# Patient Record
Sex: FEMALE | URBAN - METROPOLITAN AREA
[De-identification: names, ages, dates, MRNs, and addresses within clinical notes are randomized per-mention and may not be internally consistent; named-entity substitution may affect disease eponyms.]

---

## 2019-05-30 PROBLEM — 238131007 OVERWEIGHT: Status: ACTIVE | Noted: 2019-05-30

## 2022-04-30 ENCOUNTER — TELEPHONE ENCOUNTER (OUTPATIENT)
Dept: URBAN - METROPOLITAN AREA CLINIC 121 | Facility: CLINIC | Age: 77
End: 2022-04-30

## 2022-04-30 RX ORDER — LEVOTHYROXINE SODIUM 25 UG/1
TABLET ORAL
OUTPATIENT
Start: 2008-03-25 | End: 2019-05-30

## 2022-04-30 RX ORDER — FLAXSEED OIL 1000 MG
CAPSULE ORAL
OUTPATIENT
Start: 2008-03-25 | End: 2019-05-30

## 2022-04-30 RX ORDER — FLAXSEED OIL 1000 MG
CAPSULE ORAL
OUTPATIENT
Start: 2008-03-25

## 2022-04-30 RX ORDER — FAMOTIDINE 10 MG
TABLET ORAL
OUTPATIENT
Start: 2008-03-25

## 2022-04-30 RX ORDER — BIOTIN 10 MG
TABLET ORAL
OUTPATIENT
Start: 2008-03-25

## 2022-04-30 RX ORDER — FAMOTIDINE 10 MG
TABLET ORAL
OUTPATIENT
Start: 2008-03-25 | End: 2019-05-30

## 2022-04-30 RX ORDER — BIOTIN 10 MG
TABLET ORAL
OUTPATIENT
Start: 2008-03-25 | End: 2019-05-30

## 2022-04-30 RX ORDER — LEVOTHYROXINE SODIUM 25 UG/1
TABLET ORAL
OUTPATIENT
Start: 2008-03-25

## 2022-05-01 ENCOUNTER — TELEPHONE ENCOUNTER (OUTPATIENT)
Dept: URBAN - METROPOLITAN AREA CLINIC 121 | Facility: CLINIC | Age: 77
End: 2022-05-01

## 2022-05-01 RX ORDER — LACTOBACILLUS RHAMNOSUS GG 10B CELL
CAPSULE ORAL
Status: ACTIVE | COMMUNITY
Start: 2019-05-30

## 2022-05-01 RX ORDER — LINACLOTIDE 290 UG/1
1 CAPSULE PO QAM 30 MINUTES BEFORE FIRST MEAL CAPSULE, GELATIN COATED ORAL
Status: ACTIVE | COMMUNITY
Start: 2021-06-04

## 2022-05-01 RX ORDER — POLYETHYLENE GLYCOL-3350 AND ELECTROLYTES WITH FLAVOR PACK 240; 5.84; 2.98; 6.72; 22.72 G/278.26G; G/278.26G; G/278.26G; G/278.26G; G/278.26G
MIX AND USE AS DIRECTED POWDER, FOR SOLUTION ORAL
Status: ACTIVE | COMMUNITY
Start: 2019-05-30

## 2022-05-01 RX ORDER — ATENOLOL 50 MG/1
TABLET ORAL
Status: ACTIVE | COMMUNITY
Start: 2008-03-25

## 2022-05-01 RX ORDER — FLUTICASONE PROPIONATE AND SALMETEROL 50; 100 UG/1; UG/1
POWDER RESPIRATORY (INHALATION)
Status: ACTIVE | COMMUNITY
Start: 2019-05-30

## 2022-05-01 RX ORDER — ASPIRIN 81 MG/1
TABLET, FILM COATED ORAL
Status: ACTIVE | COMMUNITY
Start: 2019-06-27

## 2022-05-01 RX ORDER — FEXOFENADINE HYDROCHLORIDE 180 MG/1
TABLET, FILM COATED ORAL
Status: ACTIVE | COMMUNITY
Start: 2019-05-30

## 2022-05-01 RX ORDER — CHOLECALCIFEROL (VITAMIN D3) 25 MCG
TABLET,CHEWABLE ORAL
Status: ACTIVE | COMMUNITY
Start: 2019-05-30

## 2024-06-04 ENCOUNTER — DASHBOARD ENCOUNTERS (OUTPATIENT)
Age: 79
End: 2024-06-04

## 2024-06-04 ENCOUNTER — TELEPHONE ENCOUNTER (OUTPATIENT)
Dept: URBAN - METROPOLITAN AREA CLINIC 27 | Facility: CLINIC | Age: 79
End: 2024-06-04

## 2024-06-04 ENCOUNTER — OFFICE VISIT (OUTPATIENT)
Dept: URBAN - METROPOLITAN AREA CLINIC 27 | Facility: CLINIC | Age: 79
End: 2024-06-04
Payer: COMMERCIAL

## 2024-06-04 VITALS
DIASTOLIC BLOOD PRESSURE: 100 MMHG | WEIGHT: 190 LBS | BODY MASS INDEX: 28.14 KG/M2 | HEART RATE: 84 BPM | HEIGHT: 69 IN | SYSTOLIC BLOOD PRESSURE: 160 MMHG

## 2024-06-04 DIAGNOSIS — Z09 ENCOUNTER FOR COLONOSCOPY FOLLOWING COLON POLYP REMOVAL: ICD-10-CM

## 2024-06-04 DIAGNOSIS — Z86.010 PERSONAL HISTORY OF COLONIC POLYPS: ICD-10-CM

## 2024-06-04 PROBLEM — 131531000119103: Status: ACTIVE | Noted: 2024-06-04

## 2024-06-04 PROCEDURE — 99204 OFFICE O/P NEW MOD 45 MIN: CPT | Performed by: PHYSICIAN ASSISTANT

## 2024-06-04 RX ORDER — POLYETHYLENE GLYCOL-3350 AND ELECTROLYTES WITH FLAVOR PACK 240; 5.84; 2.98; 6.72; 22.72 G/278.26G; G/278.26G; G/278.26G; G/278.26G; G/278.26G
MIX AND USE AS DIRECTED POWDER, FOR SOLUTION ORAL
Status: ACTIVE | COMMUNITY
Start: 2019-05-30

## 2024-06-04 RX ORDER — ASPIRIN 81 MG/1
TABLET, FILM COATED ORAL
Status: ACTIVE | COMMUNITY
Start: 2019-06-27

## 2024-06-04 RX ORDER — CHOLECALCIFEROL (VITAMIN D3) 25 MCG
TABLET,CHEWABLE ORAL
Status: ACTIVE | COMMUNITY
Start: 2019-05-30

## 2024-06-04 RX ORDER — LINACLOTIDE 290 UG/1
1 CAPSULE PO QAM 30 MINUTES BEFORE FIRST MEAL CAPSULE, GELATIN COATED ORAL
Status: ACTIVE | COMMUNITY
Start: 2021-06-04

## 2024-06-04 RX ORDER — ATENOLOL 50 MG/1
TABLET ORAL
Status: ACTIVE | COMMUNITY
Start: 2008-03-25

## 2024-06-04 RX ORDER — LACTOBACILLUS RHAMNOSUS GG 10B CELL
CAPSULE ORAL
Status: ACTIVE | COMMUNITY
Start: 2019-05-30

## 2024-06-04 RX ORDER — FLUTICASONE PROPIONATE AND SALMETEROL 50; 100 UG/1; UG/1
POWDER RESPIRATORY (INHALATION)
Status: ACTIVE | COMMUNITY
Start: 2019-05-30

## 2024-06-04 RX ORDER — FEXOFENADINE HYDROCHLORIDE 180 MG/1
TABLET, FILM COATED ORAL
Status: ACTIVE | COMMUNITY
Start: 2019-05-30

## 2024-06-04 NOTE — HPI-ZZZTODAY'S VISIT
Ms. Mcclelland is an 78-year-old female patient of Dr. Espinoza here for colonoscopy consultation. She denies change in bowel habits and rectal bleeding. She takes medication for HTN, also takes ASA 81mg QD. She would like to undergo colonoscopy at this time for surveillance. . Colonoscopy 2019: Diverticulosis, 1 small adenomatous polyp . FH:No CRC

## 2024-06-04 NOTE — PHYSICAL EXAM CHEST:
no lesions,  no deformities,  no traumatic injuries,  no significant scars are present,  no masses present, breathing is unlabored without accessory muscle use

## 2024-07-25 ENCOUNTER — CLAIMS CREATED FROM THE CLAIM WINDOW (OUTPATIENT)
Dept: URBAN - METROPOLITAN AREA SURGERY CENTER 7 | Facility: SURGERY CENTER | Age: 79
End: 2024-07-25
Payer: COMMERCIAL

## 2024-07-25 ENCOUNTER — CLAIMS CREATED FROM THE CLAIM WINDOW (OUTPATIENT)
Dept: URBAN - METROPOLITAN AREA CLINIC 4 | Facility: CLINIC | Age: 79
End: 2024-07-25
Payer: COMMERCIAL

## 2024-07-25 ENCOUNTER — OFFICE VISIT (OUTPATIENT)
Dept: URBAN - METROPOLITAN AREA SURGERY CENTER 7 | Facility: SURGERY CENTER | Age: 79
End: 2024-07-25

## 2024-07-25 DIAGNOSIS — Z86.010 PERSONAL HISTORY OF COLON POLYPS: ICD-10-CM

## 2024-07-25 DIAGNOSIS — D12.2 BENIGN NEOPLASM OF ASCENDING COLON: ICD-10-CM

## 2024-07-25 DIAGNOSIS — D12.2 ADENOMATOUS POLYP OF ASCENDING COLON: ICD-10-CM

## 2024-07-25 DIAGNOSIS — Z12.11 COLON CANCER SCREENING (HIGH RISK): ICD-10-CM

## 2024-07-25 DIAGNOSIS — K57.30 DIVERTICULOSIS OF SIGMOID COLON: ICD-10-CM

## 2024-07-25 PROCEDURE — 88305 TISSUE EXAM BY PATHOLOGIST: CPT | Performed by: PATHOLOGY

## 2024-07-25 PROCEDURE — 00811 ANES LWR INTST NDSC NOS: CPT | Performed by: NURSE ANESTHETIST, CERTIFIED REGISTERED

## 2024-07-25 RX ORDER — ASPIRIN 81 MG/1
TABLET, FILM COATED ORAL
Status: ACTIVE | COMMUNITY
Start: 2019-06-27

## 2024-07-25 RX ORDER — CHOLECALCIFEROL (VITAMIN D3) 25 MCG
TABLET,CHEWABLE ORAL
Status: ACTIVE | COMMUNITY
Start: 2019-05-30

## 2024-07-25 RX ORDER — POLYETHYLENE GLYCOL-3350 AND ELECTROLYTES WITH FLAVOR PACK 240; 5.84; 2.98; 6.72; 22.72 G/278.26G; G/278.26G; G/278.26G; G/278.26G; G/278.26G
MIX AND USE AS DIRECTED POWDER, FOR SOLUTION ORAL
Status: ACTIVE | COMMUNITY
Start: 2019-05-30

## 2024-07-25 RX ORDER — FLUTICASONE PROPIONATE AND SALMETEROL 50; 100 UG/1; UG/1
POWDER RESPIRATORY (INHALATION)
Status: ACTIVE | COMMUNITY
Start: 2019-05-30

## 2024-07-25 RX ORDER — FEXOFENADINE HYDROCHLORIDE 180 MG/1
TABLET, FILM COATED ORAL
Status: ACTIVE | COMMUNITY
Start: 2019-05-30

## 2024-07-25 RX ORDER — ATENOLOL 50 MG/1
TABLET ORAL
Status: ACTIVE | COMMUNITY
Start: 2008-03-25

## 2024-07-25 RX ORDER — LACTOBACILLUS RHAMNOSUS GG 10B CELL
CAPSULE ORAL
Status: ACTIVE | COMMUNITY
Start: 2019-05-30

## 2024-07-25 RX ORDER — LINACLOTIDE 290 UG/1
1 CAPSULE PO QAM 30 MINUTES BEFORE FIRST MEAL CAPSULE, GELATIN COATED ORAL
Status: ACTIVE | COMMUNITY
Start: 2021-06-04